# Patient Record
Sex: FEMALE | Race: WHITE | Employment: FULL TIME | URBAN - METROPOLITAN AREA
[De-identification: names, ages, dates, MRNs, and addresses within clinical notes are randomized per-mention and may not be internally consistent; named-entity substitution may affect disease eponyms.]

---

## 2021-01-04 ENCOUNTER — HOSPITAL ENCOUNTER (OUTPATIENT)
Dept: NON INVASIVE DIAGNOSTICS | Age: 21
Discharge: HOME | End: 2021-01-04
Payer: COMMERCIAL

## 2021-01-04 PROCEDURE — 93225 XTRNL ECG REC<48 HRS REC: CPT

## 2021-01-04 PROCEDURE — 93226 XTRNL ECG REC<48 HR SCAN A/R: CPT

## 2021-01-04 NOTE — PROGRESS NOTES
[address] [facility] WITHOUT COMPLICATIONS. PT. VERBALLY UNDERSTANDS INSTRUCTIONS GIVEN.   UNIT: AB  TIME: 1393

## 2021-01-08 LAB
ACQUISITION DURATION: NORMAL S
AVERAGE HEART RATE: 84 BPM
EKG DIAGNOSIS: NORMAL
HOLTER MAX HEART RATE: 190 BPM
HOOKUP DATE: NORMAL
HOOKUP TIME: NORMAL
MAX HEART RATE TIME/DATE: NORMAL
MIN HEART RATE TIME/DATE: NORMAL
MIN HEART RATE: 52 BPM
NUMBER OF QRS COMPLEXES: NORMAL
NUMBER OF SUPRAVENTRICULAR BEATS IN RUNS: 0
NUMBER OF SUPRAVENTRICULAR COUPLETS: 0
NUMBER OF SUPRAVENTRICULAR ECTOPICS: 0
NUMBER OF SUPRAVENTRICULAR ISOLATED BEATS: 0
NUMBER OF SUPRAVENTRICULAR RUNS: 0
NUMBER OF VENTRICULAR BEATS IN RUNS: 0
NUMBER OF VENTRICULAR BIGEMINAL CYCLES: 0
NUMBER OF VENTRICULAR COUPLETS: 0
NUMBER OF VENTRICULAR ECTOPICS: 2
NUMBER OF VENTRICULAR ISOLATED BEATS: 2
NUMBER OF VENTRICULAR RUNS: 0

## 2021-01-12 ENCOUNTER — HOSPITAL ENCOUNTER (OUTPATIENT)
Age: 21
Discharge: HOME | End: 2021-01-12
Payer: COMMERCIAL

## 2021-01-12 ENCOUNTER — HOSPITAL ENCOUNTER (OUTPATIENT)
Dept: GENERAL RADIOLOGY | Age: 21
Discharge: HOME | End: 2021-01-12
Payer: COMMERCIAL

## 2021-01-12 DIAGNOSIS — R05.9 COUGH: ICD-10-CM

## 2021-01-12 PROCEDURE — 71046 X-RAY EXAM CHEST 2 VIEWS: CPT

## 2021-01-24 ENCOUNTER — TELEPHONE (OUTPATIENT)
Dept: OBGYN CLINIC | Age: 21
End: 2021-01-24

## 2021-01-25 ENCOUNTER — OFFICE VISIT (OUTPATIENT)
Dept: OBGYN CLINIC | Age: 21
End: 2021-01-25
Payer: COMMERCIAL

## 2021-01-25 VITALS
TEMPERATURE: 97 F | WEIGHT: 135.6 LBS | HEART RATE: 89 BPM | DIASTOLIC BLOOD PRESSURE: 82 MMHG | BODY MASS INDEX: 25.6 KG/M2 | HEIGHT: 61 IN | SYSTOLIC BLOOD PRESSURE: 108 MMHG

## 2021-01-25 DIAGNOSIS — L73.2 HYDRADENITIS: ICD-10-CM

## 2021-01-25 DIAGNOSIS — Z11.3 ROUTINE SCREENING FOR STI (SEXUALLY TRANSMITTED INFECTION): ICD-10-CM

## 2021-01-25 DIAGNOSIS — Z01.419 ENCNTR FOR GYN EXAM (GENERAL) (ROUTINE) W/O ABN FINDINGS: Primary | ICD-10-CM

## 2021-01-25 DIAGNOSIS — Z12.4 PAP SMEAR FOR CERVICAL CANCER SCREENING: ICD-10-CM

## 2021-01-25 PROCEDURE — 99999 PR OFFICE/OUTPT VISIT,PROCEDURE ONLY: CPT | Performed by: OBSTETRICS & GYNECOLOGY

## 2021-01-25 PROCEDURE — 99395 PREV VISIT EST AGE 18-39: CPT | Performed by: OBSTETRICS & GYNECOLOGY

## 2021-01-25 RX ORDER — ETONOGESTREL AND ETHINYL ESTRADIOL 11.7; 2.7 MG/1; MG/1
1 INSERT, EXTENDED RELEASE VAGINAL SEE ADMIN INSTRUCTIONS
COMMUNITY
End: 2021-01-25 | Stop reason: SDUPTHER

## 2021-01-25 RX ORDER — ETONOGESTREL AND ETHINYL ESTRADIOL 11.7; 2.7 MG/1; MG/1
1 INSERT, EXTENDED RELEASE VAGINAL SEE ADMIN INSTRUCTIONS
Qty: 3 EACH | Refills: 4 | Status: SHIPPED | OUTPATIENT
Start: 2021-01-25 | End: 2022-01-31

## 2021-01-25 ASSESSMENT — ENCOUNTER SYMPTOMS
CONSTIPATION: 0
COUGH: 0
SORE THROAT: 0
DIARRHEA: 0
SHORTNESS OF BREATH: 0
ABDOMINAL PAIN: 0
VOMITING: 0
NAUSEA: 0

## 2021-01-25 NOTE — PROGRESS NOTES
Annual Exam      CC:   Chief Complaint   Patient presents with    Gynecologic Exam     annual       HPI:  23 y.o. Farhad Perez presents for her gynecologic annual exam.    Patient seen and examined. Patient is overall doing well. Patient is currently on Nuvaring. Patient reports menses are regular, occurring monthly, last for 6-7 days and are lighter with Nuvaring than with OCPs. Reports every month with her cycle she will get abscesses along her underwear line. States it will be anywhere from a hard bump to a white head. No bigger than a pea in size. Denies significant past medical history. Health Maintenance:  Birth control: Nuvaring  Pregnancy plans: None currently   Safe relationship: Yes - together for 9 years    Screening:  Last pap smear: Has never had  History of abnormal pap smears: N/A  STI screening: Negative in the past    Vaccines:  Gardasil vaccine: Does not think she has had   Flu vaccine: Has had     Review of Systems:   Review of Systems   Constitutional: Negative for chills and fever. HENT: Negative for congestion and sore throat. Respiratory: Negative for cough and shortness of breath. Cardiovascular: Negative for chest pain and palpitations. Gastrointestinal: Negative for abdominal pain, constipation, diarrhea, nausea and vomiting. Genitourinary: Negative for dysuria, frequency, menstrual problem, pelvic pain and vaginal discharge. Neurological: Negative for dizziness and headaches. Breast: Denies skin changes, nipple discharge, lesions, dimpling, tenderness or palpable masses     Primary Care Physician: Helen Banda MD    Obstetric History  OB History    Para Term  AB Living   0 0 0 0 0 0   SAB TAB Ectopic Molar Multiple Live Births   0 0 0 0 0 0       GynecologicHistory  Menstrual History:  ? LMP: Patient's last menstrual period was 2021 (exact date). ? Age of Menarche: 14  ? Menstrual Period: regular  ?  Interval Between Menses: Monthly ? Duration of Menses: 6-7 days  ? Menstrual Flow: Light  ? Bleeding between menses: Denies    Sexual History:  ? Contraception: see above  ? Currently is sexually active  ? 1 Lifetime partners  ? Denies history of STIs  ? Deneis sexual problems    Pap History:  ? History of abnormal pap smears: see above  ? Last pap: see above      Medical History:  History reviewed. No pertinent past medical history. Medications:  Current Outpatient Medications   Medication Sig Dispense Refill    clindamycin (CLEOCIN-T) 1 % external solution Apply topically 2 times daily. 30 mL 0    etonogestrel-ethinyl estradiol (NUVARING) 0.12-0.015 MG/24HR vaginal ring Place 1 each vaginally See Admin Instructions 3 each 4     No current facility-administered medications for this visit. Surgical History:  History reviewed. No pertinent surgical history. Allergies:  No Known Allergies    Family History:  History reviewed. No pertinent family history. Denies personal/family history of cervical, uterine, ovarian, vulvar, breast, or colon cancers.   Denies personal/family history of bleeding or clotting disorders  Denies personal/family history of genetic disorders    Social History:  Social History     Socioeconomic History    Marital status: Single     Spouse name: None    Number of children: None    Years of education: None    Highest education level: None   Occupational History    None   Social Needs    Financial resource strain: None    Food insecurity     Worry: None     Inability: None    Transportation needs     Medical: None     Non-medical: None   Tobacco Use    Smoking status: Never Smoker    Smokeless tobacco: Never Used   Substance and Sexual Activity    Alcohol use: Yes     Comment: socially    Drug use: No    Sexual activity: Never   Lifestyle    Physical activity     Days per week: None     Minutes per session: None    Stress: None   Relationships    Social connections     Talks on phone: None Gets together: None     Attends Jainism service: None     Active member of club or organization: None     Attends meetings of clubs or organizations: None     Relationship status: None    Intimate partner violence     Fear of current or ex partner: None     Emotionally abused: None     Physically abused: None     Forced sexual activity: None   Other Topics Concern    None   Social History Narrative    None       Objective: Body mass index is 25.62 kg/m². /82 (Site: Right Upper Arm, Position: Sitting, Cuff Size: Medium Adult)   Pulse 89   Temp 97 °F (36.1 °C) (Infrared)   Ht 5' 1\" (1.549 m)   Wt 135 lb 9.6 oz (61.5 kg)   LMP 01/14/2021 (Exact Date)   BMI 25.62 kg/m²     Exam:   Physical Exam  Vitals signs reviewed. Exam conducted with a chaperone present. Constitutional:       General: She is not in acute distress. Appearance: She is well-developed. HENT:      Head: Normocephalic and atraumatic. Eyes:      Extraocular Movements: Extraocular movements intact. Conjunctiva/sclera: Conjunctivae normal.   Neck:      Musculoskeletal: Normal range of motion and neck supple. Thyroid: No thyromegaly. Cardiovascular:      Rate and Rhythm: Normal rate. Pulmonary:      Effort: Pulmonary effort is normal. No respiratory distress. Chest:      Breasts:         Right: No mass, nipple discharge, skin change or tenderness. Left: No mass, nipple discharge, skin change or tenderness. Abdominal:      General: There is no distension. Palpations: Abdomen is soft. There is no mass. Tenderness: There is no abdominal tenderness. There is no guarding or rebound. Genitourinary:     General: Normal vulva. Exam position: Lithotomy position. Labia:         Right: No rash, tenderness or lesion. Left: No rash, tenderness or lesion. Vagina: No signs of injury. No vaginal discharge, erythema, tenderness, bleeding or lesions. Cervix: No cervical motion tenderness, discharge, friability, lesion, erythema or cervical bleeding. Uterus: Not deviated, not enlarged, not fixed and not tender. Adnexa:         Right: No mass, tenderness or fullness. Left: No mass, tenderness or fullness. Comments: Scar tracts along right inguinal fold suggestive of hidradenitis suppuritiva. No current abscess present. Skin:     General: Skin is warm and dry. Neurological:      Mental Status: She is alert and oriented to person, place, and time. Psychiatric:         Mood and Affect: Mood normal.         Behavior: Behavior normal.         Thought Content: Thought content normal.         Assessment/Plan:  23 y.o. James Gaston presenting for her annual exam:    1. Encntr for gyn exam (general) (routine) w/o abn findings     - Pap smear collected today - will call with results     - Age based screening recommendations discussed     - Self breast exams/awareness discussed with the patient     - Healthy lifestyle habits discussed including calcium and vitamin D supplementation     - Will follow-up in 1 year for annual exam     2. Pap smear for cervical cancer screening     - Pap smear collected today - will call with results     - Age based screening recommendations discussed    3. Routine screening for STI (sexually transmitted infection)     - Reviewed condom use for STI prevention     - Will perform routine screening today and call with results   - C.trachomatis N.gonorrhoeae DNA    4.  Hydradenitis     - Old scar tract at patient's location of concern regarding recurrent abscess     - No current evidence of infection or drainage     - Reviewed diagnosis and risk of recurrence and or scarring     - Will start with prn Cleocin solution and monitor      - If become recurrent and unresponsive to Cleocin will consider PO prophylaxis     - Reviewed avoiding shaving in this region may reduce recurrence    Anibal Jama DO

## 2021-01-26 LAB
C TRACH DNA GENITAL QL NAA+PROBE: NEGATIVE
N. GONORRHOEAE DNA: NEGATIVE

## 2021-01-27 PROBLEM — L73.2 HYDRADENITIS: Status: ACTIVE | Noted: 2021-01-27

## 2021-01-27 RX ORDER — CLINDAMYCIN PHOSPHATE 11.9 MG/ML
SOLUTION TOPICAL
Qty: 30 ML | Refills: 0 | Status: SHIPPED | OUTPATIENT
Start: 2021-01-27 | End: 2021-02-26

## 2022-03-01 NOTE — TELEPHONE ENCOUNTER
[phone] ([place])     Placed call to patient, no answer, unable to leave VM     Please advise.

## 2022-03-01 NOTE — TELEPHONE ENCOUNTER
Patient returned call to office and scheduled PAP, will need one more refill prior to appt. Please advise.      Routing to Dr. Chelsea Coreas

## 2022-03-02 RX ORDER — ETONOGESTREL AND ETHINYL ESTRADIOL 11.7; 2.7 MG/1; MG/1
INSERT, EXTENDED RELEASE VAGINAL
Qty: 3 EACH | Refills: 0 | Status: SHIPPED | OUTPATIENT
Start: 2022-03-02 | End: 2022-05-26

## 2022-03-02 RX ORDER — ETONOGESTREL AND ETHINYL ESTRADIOL 11.7; 2.7 MG/1; MG/1
INSERT, EXTENDED RELEASE VAGINAL
OUTPATIENT
Start: 2022-03-02

## 2022-05-26 RX ORDER — ETONOGESTREL AND ETHINYL ESTRADIOL .12; .015 MG/D; MG/D
RING VAGINAL
Qty: 3 EACH | Refills: 0 | Status: SHIPPED | OUTPATIENT
Start: 2022-05-26 | End: 2022-06-15 | Stop reason: SDUPTHER

## 2022-06-08 ENCOUNTER — OFFICE VISIT (OUTPATIENT)
Dept: OBGYN CLINIC | Age: 22
End: 2022-06-08
Payer: COMMERCIAL

## 2022-06-08 VITALS
WEIGHT: 127.2 LBS | HEIGHT: 61 IN | DIASTOLIC BLOOD PRESSURE: 76 MMHG | SYSTOLIC BLOOD PRESSURE: 130 MMHG | HEART RATE: 89 BPM | BODY MASS INDEX: 24.02 KG/M2 | TEMPERATURE: 97.9 F

## 2022-06-08 DIAGNOSIS — Z30.44 ENCOUNTER FOR SURVEILLANCE OF VAGINAL RING HORMONAL CONTRACEPTIVE DEVICE: ICD-10-CM

## 2022-06-08 DIAGNOSIS — Z01.419 ENCNTR FOR GYN EXAM (GENERAL) (ROUTINE) W/O ABN FINDINGS: Primary | ICD-10-CM

## 2022-06-08 PROCEDURE — 99395 PREV VISIT EST AGE 18-39: CPT | Performed by: OBSTETRICS & GYNECOLOGY

## 2022-06-08 ASSESSMENT — ENCOUNTER SYMPTOMS
CONSTIPATION: 0
COUGH: 0
SORE THROAT: 0
NAUSEA: 0
VOMITING: 0
DIARRHEA: 0
SHORTNESS OF BREATH: 0
ABDOMINAL PAIN: 0

## 2022-06-08 NOTE — PROGRESS NOTES
Annual Exam      CC:   Chief Complaint   Patient presents with    Annual Exam       HPI:  20 y.o. Sanjuanita Andrews presents for her gynecologic annual exam.    Patient seen and examined. Patient is overall doing well. Patient is currently on Nuvaring. Patient reports menses are regular, occurring monthly, last for 6-7 days and are light in flow    Denies significant past medical history. Health Maintenance:  Birth control: Mable Failing  Pregnancy plans: None currently   Safe relationship: Yes - together for 10 years    Screening:  Last pap smear: 2021 - NILM  History of abnormal pap smears: Denies  STI screening: Negative in the past    Vaccines:  Gardasil vaccine: Does not think she has had   Flu vaccine: Has not had   COVID-19 vaccine: Has had series    Review of Systems:   Review of Systems   Constitutional: Negative for chills and fever. HENT: Negative for congestion and sore throat. Respiratory: Negative for cough and shortness of breath. Cardiovascular: Negative for chest pain and palpitations. Gastrointestinal: Negative for abdominal pain, constipation, diarrhea, nausea and vomiting. Genitourinary: Negative for dysuria, frequency, menstrual problem, pelvic pain and vaginal discharge. Neurological: Negative for dizziness and headaches. Breast: Denies skin changes, nipple discharge, lesions, dimpling, tenderness or palpable masses     Primary Care Physician: [address], [place]    Obstetric History  OB History    Para Term  AB Living   0 0 0 0 0 0   SAB IAB Ectopic Molar Multiple Live Births   0 0 0 0 0 0       GynecologicHistory  Menstrual History:   LMP: Patient's last menstrual period was 2022 (exact date).     Age of Menarche: 14   Menstrual Period: regular   Interval Between Menses: Monthly   Duration of Menses: 6-7 days   Menstrual Flow: Light   Bleeding between menses: Denies    Sexual History:   Contraception: see above   Currently is sexually active   1 Lifetime partners   Denies history of STIs   Deneis sexual problems    Pap History:   History of abnormal pap smears: see above   Last pap: see above      Medical History:  No past medical history on file. Medications:  Current Outpatient Medications   Medication Sig Dispense Refill    ELURYNG 0.12-0.015 MG/24HR vaginal ring INSERT 1 RING VAGINALLY FOR 21 DAYS, THEN REMOVE FOR 7 DAYS 3 each 0     No current facility-administered medications for this visit. Surgical History:  No past surgical history on file. Allergies:  No Known Allergies    Family History:  No family history on file. Denies personal/family history of cervical, uterine, ovarian, vulvar, breast, or colon cancers. Denies personal/family history of bleeding or clotting disorders  Denies personal/family history of genetic disorders    Social History:  Social History     Socioeconomic History    Marital status: Single     Spouse name: Not on file    Number of children: Not on file    Years of education: Not on file    Highest education level: Not on file   Occupational History    Not on file   Tobacco Use    Smoking status: Never Smoker    Smokeless tobacco: Never Used   Vaping Use    Vaping Use: Never used   Substance and Sexual Activity    Alcohol use: Yes     Comment: socially    Drug use: No    Sexual activity: Never   Other Topics Concern    Not on file   Social History Narrative    Not on file     Social Determinants of Health     Financial Resource Strain:     Difficulty of Paying Living Expenses: Not on file   Food Insecurity:     Worried About Running Out of Food in the Last Year: Not on file    Jerardo of Food in the Last Year: Not on file   Transportation Needs:     Lack of Transportation (Medical): Not on file    Lack of Transportation (Non-Medical):  Not on file   Physical Activity:     Days of Exercise per Week: Not on file    Minutes of Exercise per Session: Not on file   Stress:     Feeling of Stress : Not on file   Social Connections:     Frequency of Communication with Friends and Family: Not on file    Frequency of Social Gatherings with Friends and Family: Not on file    Attends Sabianism Services: Not on file    Active Member of Clubs or Organizations: Not on file    Attends Club or Organization Meetings: Not on file    Marital Status: Not on file   Intimate Partner Violence:     Fear of Current or Ex-Partner: Not on file    Emotionally Abused: Not on file    Physically Abused: Not on file    Sexually Abused: Not on file   Housing Stability:     Unable to Pay for Housing in the Last Year: Not on file    Number of [place] in the Last Year: Not on file    Unstable Housing in the Last Year: Not on file       Objective: Body mass index is 24.03 kg/m². /76 (Site: Left Upper Arm, Position: Sitting, Cuff Size: Medium Adult)   Pulse 89   Temp 97.9 °F (36.6 °C) (Infrared)   Ht 5' 1\" (1.549 m)   Wt 127 lb 3.2 oz (57.7 kg)   LMP 05/25/2022 (Exact Date)   BMI 24.03 kg/m²     Exam:   Physical Exam  Vitals reviewed. Exam conducted with a chaperone present. Constitutional:       General: She is not in acute distress. Appearance: She is well-developed. HENT:      Head: Normocephalic and atraumatic. Eyes:      Extraocular Movements: Extraocular movements intact. Conjunctiva/sclera: Conjunctivae normal.   Neck:      Thyroid: No thyromegaly. Cardiovascular:      Rate and Rhythm: Normal rate. Pulmonary:      Effort: Pulmonary effort is normal. No respiratory distress. Chest:   Breasts:      Right: No mass, nipple discharge, skin change or tenderness. Left: No mass, nipple discharge, skin change or tenderness. Abdominal:      General: There is no distension. Palpations: Abdomen is soft. There is no mass. Tenderness: There is no abdominal tenderness. There is no guarding or rebound. Genitourinary:     General: Normal vulva.       Exam position: Lithotomy position. Labia:         Right: No rash, tenderness or lesion. Left: No rash, tenderness or lesion. Vagina: No signs of injury. No vaginal discharge, erythema, tenderness, bleeding or lesions. Cervix: No cervical motion tenderness, discharge, friability, lesion, erythema or cervical bleeding. Uterus: Not deviated, not enlarged, not fixed and not tender. Adnexa:         Right: No mass, tenderness or fullness. Left: No mass, tenderness or fullness. Musculoskeletal:      Cervical back: Normal range of motion and neck supple. Skin:     General: Skin is warm and dry. Neurological:      Mental Status: She is alert and oriented to person, place, and time. Psychiatric:         Mood and Affect: Mood normal.         Behavior: Behavior normal.         Thought Content: Thought content normal.         Assessment/Plan:  20 y.o. Anisa Ascencio presenting for her annual exam:    1. Encntr for gyn exam (general) (routine) w/o abn findings     - Pap smear up to date     - Age based screening recommendations discussed     - Declines STI screening today      - Self breast exams/awareness discussed with the patient     - Healthy lifestyle habits discussed     - Will follow-up in 1 year for annual exam     2.  Encounter for surveillance of vaginal ring hormonal contraceptive device     - Doing well with Nuvaring without complaints     - Risks, benefits and alternatives were reviewed     - Refill provided x1 year     - Will follow-up in 1 year for annual exam      Luzma Saucedo DO

## 2022-06-15 RX ORDER — ETONOGESTREL AND ETHINYL ESTRADIOL 11.7; 2.7 MG/1; MG/1
1 INSERT, EXTENDED RELEASE VAGINAL SEE ADMIN INSTRUCTIONS
Qty: 3 EACH | Refills: 5 | Status: SHIPPED | OUTPATIENT
Start: 2022-06-15

## 2023-07-12 ENCOUNTER — TELEPHONE (OUTPATIENT)
Dept: OBGYN CLINIC | Age: 23
End: 2023-07-12

## 2023-07-12 RX ORDER — FLUCONAZOLE 150 MG/1
150 TABLET ORAL
Qty: 2 TABLET | Refills: 0 | Status: SHIPPED | OUTPATIENT
Start: 2023-07-12 | End: 2023-07-18

## 2023-07-12 NOTE — TELEPHONE ENCOUNTER
Pt states she was treated with ATB recently and now is experiencing yeast infection symptoms. Pt reports thick white discharge, itching, burning. Pt scheduled annual on 8/8/23.   Pended diflucan  Please sign or advise

## 2023-07-25 RX ORDER — ETONOGESTREL AND ETHINYL ESTRADIOL 11.7; 2.7 MG/1; MG/1
INSERT, EXTENDED RELEASE VAGINAL
Qty: 1 EACH | Refills: 0 | Status: SHIPPED | OUTPATIENT
Start: 2023-07-25 | End: 2023-08-08 | Stop reason: SDUPTHER

## 2023-08-08 ENCOUNTER — OFFICE VISIT (OUTPATIENT)
Dept: OBGYN CLINIC | Age: 23
End: 2023-08-08
Payer: COMMERCIAL

## 2023-08-08 VITALS
DIASTOLIC BLOOD PRESSURE: 74 MMHG | BODY MASS INDEX: 24.05 KG/M2 | TEMPERATURE: 97.6 F | SYSTOLIC BLOOD PRESSURE: 118 MMHG | WEIGHT: 127.4 LBS | HEIGHT: 61 IN | HEART RATE: 83 BPM

## 2023-08-08 DIAGNOSIS — Z12.4 PAP SMEAR FOR CERVICAL CANCER SCREENING: ICD-10-CM

## 2023-08-08 DIAGNOSIS — Z01.419 ENCNTR FOR GYN EXAM (GENERAL) (ROUTINE) W/O ABN FINDINGS: Primary | ICD-10-CM

## 2023-08-08 DIAGNOSIS — Z30.49 ENCOUNTER FOR SURVEILLANCE OF NUVARING: ICD-10-CM

## 2023-08-08 PROCEDURE — 99395 PREV VISIT EST AGE 18-39: CPT | Performed by: OBSTETRICS & GYNECOLOGY

## 2023-08-08 RX ORDER — ETONOGESTREL AND ETHINYL ESTRADIOL 11.7; 2.7 MG/1; MG/1
1 INSERT, EXTENDED RELEASE VAGINAL
Qty: 3 EACH | Refills: 5 | Status: SHIPPED | OUTPATIENT
Start: 2023-08-08

## 2023-08-08 RX ORDER — LORATADINE 10 MG/1
TABLET ORAL
COMMUNITY
Start: 2023-07-27

## 2023-08-08 ASSESSMENT — ENCOUNTER SYMPTOMS
NAUSEA: 0
COUGH: 0
ABDOMINAL PAIN: 0
SHORTNESS OF BREATH: 0
VOMITING: 0
CONSTIPATION: 0
DIARRHEA: 0
SORE THROAT: 0

## 2023-08-08 NOTE — PROGRESS NOTES
Annual Exam      CC:   Chief Complaint   Patient presents with    Annual Exam       HPI:  24 y.o. Nelly Amaya presents for her gynecologic annual exam.    Patient seen and examined. Patient is overall doing well. Patient is currently on Nuvaring. Patient reports menses are regular, occurring monthly, last for 6-7 days and are light in flow. Denies pain with menses. Denies significant past medical history. Had a yeast infection recently after antibiotic treatment for a dental infection. Took Diflucan which resolved symptoms. Health Maintenance:  Birth control: Efrain Laienstein  Pregnancy plans: None currently   Safe relationship: Yes - together for 11 years - getting  2023    Screening:  Last pap smear: 2021 - NILM  History of abnormal pap smears: Denies  STI screening: Negative in the past    Vaccines:  Gardasil vaccine: Does not think she has had   Flu vaccine: Has not had   COVID-19 vaccine: Has had series    Review of Systems:   Review of Systems   Constitutional:  Negative for chills and fever. HENT:  Negative for congestion and sore throat. Respiratory:  Negative for cough and shortness of breath. Cardiovascular:  Negative for chest pain and palpitations. Gastrointestinal:  Negative for abdominal pain, constipation, diarrhea, nausea and vomiting. Genitourinary:  Negative for dysuria, frequency, menstrual problem, pelvic pain and vaginal discharge. Neurological:  Negative for dizziness and headaches. Breast: Denies skin changes, nipple discharge, lesions, dimpling, tenderness or palpable masses     Primary Care Physician: MD Yesenia    Obstetric History  OB History    Para Term  AB Living   0 0 0 0 0 0   SAB IAB Ectopic Molar Multiple Live Births   0 0 0 0 0 0       GynecologicHistory  Menstrual History:  LMP: Patient's last menstrual period was 2023 (exact date).    Age of Menarche: 14  Menstrual Period: regular  Interval Between Menses: Monthly  Duration

## 2023-08-31 ENCOUNTER — TELEPHONE (OUTPATIENT)
Dept: OBGYN CLINIC | Age: 23
End: 2023-08-31

## 2023-08-31 DIAGNOSIS — R30.0 DYSURIA: Primary | ICD-10-CM

## 2023-08-31 NOTE — TELEPHONE ENCOUNTER
Azo and hydration. Is she able to go to a different outpatient facility to get a culture so we can start antibiotics, we would want to have that collected before starting them?

## 2023-08-31 NOTE — TELEPHONE ENCOUNTER
Patient calling with symptoms of UTI for 3 days. She has taken Azo and it helped with some of the burning with urination. Her current symptoms are burning with urination, and frequency. Patient is scheduled for a lab visit tomorrow 9/1/23 for UA, UCX. Patient wanting to know if there is anything else besides the Azo while she waits for culture? Routing to Dr. Tamika Mckeon.

## 2023-08-31 NOTE — TELEPHONE ENCOUNTER
Patient will not be able to get to an outpatient facility today. Tomorrow's appointment is the soonest she will be able to do. Patient will push fluids and continue the Azo in the meantime. She is okay to start an antibiotic after tomorrow's lab visit. Routing to Dr. Dajuan Lofton.

## 2023-09-01 RX ORDER — NITROFURANTOIN 25; 75 MG/1; MG/1
100 CAPSULE ORAL 2 TIMES DAILY
Qty: 20 CAPSULE | Refills: 0 | Status: SHIPPED | OUTPATIENT
Start: 2023-09-01 | End: 2023-09-11

## 2023-09-01 NOTE — TELEPHONE ENCOUNTER
Patient is feeling better with the Azo, and got called into work today. She is going to cancel the lab visit for today and see how she feels over the weekend. She will call back on Tuesday if she is needing an appointment or go to a little clinic. Routing to Dr. Faustino Singh as Maribel Sage.

## 2023-09-01 NOTE — TELEPHONE ENCOUNTER
We can alternatively place an outpatient order and send a script for [facility] and she can stop by any [facility] outpatient lab over the weekend prior to starting antibiotics. Thank you.

## 2023-09-01 NOTE — TELEPHONE ENCOUNTER
Left message on patients voicemail as she was called into work today with instructions. Let her know the lab order will be signed for any [facility] lab she can stop by one over the weekend, gave lab locations open over the weekend. Then she can start macrobid. Please sign pending orders. Routing to Dr. Nayana Carlos.